# Patient Record
(demographics unavailable — no encounter records)

---

## 2025-07-02 NOTE — HEALTH RISK ASSESSMENT
[Good] : ~his/her~  mood as  good [No falls in past year] : Patient reported no falls in the past year [0] : 2) Feeling down, depressed, or hopeless: Not at all (0) [PHQ-2 Negative - No further assessment needed] : PHQ-2 Negative - No further assessment needed [No] : does not take [Never] : Never [NO] : No [HIV test declined] : HIV test declined [Hepatitis C test declined] : Hepatitis C test declined [With Family] : lives with family [Sexually Active] : sexually active [Feels Safe at Home] : Feels safe at home [Fully functional (bathing, dressing, toileting, transferring, walking, feeding)] : Fully functional (bathing, dressing, toileting, transferring, walking, feeding) [Fully functional (using the telephone, shopping, preparing meals, housekeeping, doing laundry, using] : Fully functional and needs no help or supervision to perform IADLs (using the telephone, shopping, preparing meals, housekeeping, doing laundry, using transportation, managing medications and managing finances) [[id number]] : 0 [Change in mental status noted] : No change in mental status noted [Language] : denies difficulty with language [Behavior] : denies difficulty with behavior [Learning/Retaining New Information] : denies difficulty learning/retaining new information [Handling Complex Tasks] : denies difficulty handling complex tasks [Reasoning] : denies difficulty with reasoning [Spatial Ability and Orientation] : denies difficulty with spatial ability and orientation [Reports changes in hearing] : Reports no changes in hearing [Reports changes in vision] : Reports no changes in vision

## 2025-07-02 NOTE — REVIEW OF SYSTEMS
[Negative] : Heme/Lymph [Earache] : no earache [Nasal Discharge] : nasal discharge [Sore Throat] : sore throat [Postnasal Drip] : postnasal drip [Itching] : no itching [Skin Rash] : no skin rash [de-identified] : HS

## 2025-07-02 NOTE — ASSESSMENT
[Vaccines Reviewed] : Immunizations reviewed today. Please see immunization details in the vaccine log within the immunization flowsheet.  [FreeTextEntry1] : 38 year is here for a CPE. She was counselled on diet and exercise, drug and alcohol use, age appropriate health care maintenance including vaccines, seatbelts, sunscreen, stress reduction and safe sex. All questions asked/answered to the best of my ability. Labs sent.   MDI refilled.  Referred to DERM and GYN.  Will try azelastine for PND. If no change, will send to ENT for laryngoscopy.

## 2025-07-02 NOTE — HISTORY OF PRESENT ILLNESS
[de-identified] : 37 y/o female presents for CPE. Last seen >2 year ago. Has IUD in place-due to come out. Needs referral. Needs to see DERM re: HS. Has been treated for folliculitis in the past but believes this is the issue and has multiple break out areas now.  Feels like things get "stuck" in throat. Has chronic PND/chronic sinusitis.Has used intranasal steroids in the past. DOesn't tolerate sinus rinses. Feels like there is always "thick mucus" there.  Uses MDI ~ 1/month. Needs refill.